# Patient Record
Sex: FEMALE | Race: WHITE | NOT HISPANIC OR LATINO | Employment: OTHER | URBAN - METROPOLITAN AREA
[De-identification: names, ages, dates, MRNs, and addresses within clinical notes are randomized per-mention and may not be internally consistent; named-entity substitution may affect disease eponyms.]

---

## 2022-05-23 ENCOUNTER — APPOINTMENT (OUTPATIENT)
Dept: CT IMAGING | Facility: CLINIC | Age: 72
End: 2022-05-23
Attending: EMERGENCY MEDICINE
Payer: COMMERCIAL

## 2022-05-23 ENCOUNTER — HOSPITAL ENCOUNTER (EMERGENCY)
Facility: CLINIC | Age: 72
Discharge: HOME | End: 2022-05-23
Attending: EMERGENCY MEDICINE | Admitting: EMERGENCY MEDICINE
Payer: COMMERCIAL

## 2022-05-23 ENCOUNTER — APPOINTMENT (OUTPATIENT)
Dept: ULTRASOUND IMAGING | Facility: CLINIC | Age: 72
End: 2022-05-23
Attending: EMERGENCY MEDICINE
Payer: COMMERCIAL

## 2022-05-23 VITALS
HEIGHT: 65 IN | DIASTOLIC BLOOD PRESSURE: 64 MMHG | OXYGEN SATURATION: 89 % | HEART RATE: 81 BPM | TEMPERATURE: 98.2 F | SYSTOLIC BLOOD PRESSURE: 113 MMHG | WEIGHT: 191 LBS | BODY MASS INDEX: 31.82 KG/M2 | RESPIRATION RATE: 24 BRPM

## 2022-05-23 DIAGNOSIS — R91.8 PULMONARY NODULES: ICD-10-CM

## 2022-05-23 DIAGNOSIS — L50.9 HIVES: ICD-10-CM

## 2022-05-23 DIAGNOSIS — R06.02 SOB (SHORTNESS OF BREATH): ICD-10-CM

## 2022-05-23 DIAGNOSIS — M79.89 LEG SWELLING: ICD-10-CM

## 2022-05-23 LAB
ALBUMIN UR-MCNC: 10 MG/DL
ANION GAP SERPL CALCULATED.3IONS-SCNC: 13 MMOL/L (ref 5–18)
APPEARANCE UR: CLEAR
BILIRUB UR QL STRIP: NEGATIVE
BNP SERPL-MCNC: 38 PG/ML (ref 0–123)
BUN SERPL-MCNC: 7 MG/DL (ref 8–28)
CALCIUM SERPL-MCNC: 9.2 MG/DL (ref 8.5–10.5)
CHLORIDE BLD-SCNC: 104 MMOL/L (ref 98–107)
CO2 SERPL-SCNC: 25 MMOL/L (ref 22–31)
COLOR UR AUTO: ABNORMAL
CREAT SERPL-MCNC: 0.74 MG/DL (ref 0.6–1.1)
ERYTHROCYTE [DISTWIDTH] IN BLOOD BY AUTOMATED COUNT: 13.9 % (ref 10–15)
GFR SERPL CREATININE-BSD FRML MDRD: 86 ML/MIN/1.73M2
GLUCOSE BLD-MCNC: 115 MG/DL (ref 70–125)
GLUCOSE UR STRIP-MCNC: NEGATIVE MG/DL
HCT VFR BLD AUTO: 45.7 % (ref 35–47)
HGB BLD-MCNC: 15.2 G/DL (ref 11.7–15.7)
HGB UR QL STRIP: ABNORMAL
HOLD SPECIMEN: NORMAL
HYALINE CASTS: 3 /LPF
KETONES UR STRIP-MCNC: NEGATIVE MG/DL
LEUKOCYTE ESTERASE UR QL STRIP: NEGATIVE
MCH RBC QN AUTO: 32 PG (ref 26.5–33)
MCHC RBC AUTO-ENTMCNC: 33.3 G/DL (ref 31.5–36.5)
MCV RBC AUTO: 96 FL (ref 78–100)
MUCOUS THREADS #/AREA URNS LPF: PRESENT /LPF
NITRATE UR QL: NEGATIVE
PH UR STRIP: 5.5 [PH] (ref 5–7)
PLATELET # BLD AUTO: 202 10E3/UL (ref 150–450)
POTASSIUM BLD-SCNC: 3.3 MMOL/L (ref 3.5–5)
RBC # BLD AUTO: 4.75 10E6/UL (ref 3.8–5.2)
RBC URINE: 9 /HPF
SODIUM SERPL-SCNC: 142 MMOL/L (ref 136–145)
SP GR UR STRIP: 1.04 (ref 1–1.03)
SQUAMOUS EPITHELIAL: 1 /HPF
TRANSITIONAL EPI: <1 /HPF
TROPONIN I SERPL-MCNC: 0.01 NG/ML (ref 0–0.29)
UROBILINOGEN UR STRIP-MCNC: <2 MG/DL
WBC # BLD AUTO: 6.3 10E3/UL (ref 4–11)
WBC URINE: 7 /HPF

## 2022-05-23 PROCEDURE — 99285 EMERGENCY DEPT VISIT HI MDM: CPT | Mod: 25

## 2022-05-23 PROCEDURE — 71275 CT ANGIOGRAPHY CHEST: CPT

## 2022-05-23 PROCEDURE — 84484 ASSAY OF TROPONIN QUANT: CPT | Performed by: EMERGENCY MEDICINE

## 2022-05-23 PROCEDURE — 93970 EXTREMITY STUDY: CPT

## 2022-05-23 PROCEDURE — 250N000011 HC RX IP 250 OP 636: Performed by: EMERGENCY MEDICINE

## 2022-05-23 PROCEDURE — 85027 COMPLETE CBC AUTOMATED: CPT | Performed by: EMERGENCY MEDICINE

## 2022-05-23 PROCEDURE — 250N000009 HC RX 250: Performed by: EMERGENCY MEDICINE

## 2022-05-23 PROCEDURE — 83880 ASSAY OF NATRIURETIC PEPTIDE: CPT | Performed by: EMERGENCY MEDICINE

## 2022-05-23 PROCEDURE — 94640 AIRWAY INHALATION TREATMENT: CPT

## 2022-05-23 PROCEDURE — 81001 URINALYSIS AUTO W/SCOPE: CPT | Performed by: EMERGENCY MEDICINE

## 2022-05-23 PROCEDURE — 250N000013 HC RX MED GY IP 250 OP 250 PS 637: Performed by: EMERGENCY MEDICINE

## 2022-05-23 PROCEDURE — 93005 ELECTROCARDIOGRAM TRACING: CPT | Performed by: EMERGENCY MEDICINE

## 2022-05-23 PROCEDURE — 82310 ASSAY OF CALCIUM: CPT | Performed by: EMERGENCY MEDICINE

## 2022-05-23 PROCEDURE — 96374 THER/PROPH/DIAG INJ IV PUSH: CPT | Mod: 59

## 2022-05-23 PROCEDURE — 36415 COLL VENOUS BLD VENIPUNCTURE: CPT | Performed by: EMERGENCY MEDICINE

## 2022-05-23 PROCEDURE — 96375 TX/PRO/DX INJ NEW DRUG ADDON: CPT | Mod: 59

## 2022-05-23 RX ORDER — METHYLPREDNISOLONE SODIUM SUCCINATE 125 MG/2ML
125 INJECTION, POWDER, LYOPHILIZED, FOR SOLUTION INTRAMUSCULAR; INTRAVENOUS ONCE
Status: COMPLETED | OUTPATIENT
Start: 2022-05-23 | End: 2022-05-23

## 2022-05-23 RX ORDER — POTASSIUM CHLORIDE 1500 MG/1
40 TABLET, EXTENDED RELEASE ORAL ONCE
Status: COMPLETED | OUTPATIENT
Start: 2022-05-23 | End: 2022-05-23

## 2022-05-23 RX ORDER — DIPHENHYDRAMINE HYDROCHLORIDE 50 MG/ML
25 INJECTION INTRAMUSCULAR; INTRAVENOUS ONCE
Status: COMPLETED | OUTPATIENT
Start: 2022-05-23 | End: 2022-05-23

## 2022-05-23 RX ORDER — IPRATROPIUM BROMIDE AND ALBUTEROL SULFATE 2.5; .5 MG/3ML; MG/3ML
3 SOLUTION RESPIRATORY (INHALATION) ONCE
Status: COMPLETED | OUTPATIENT
Start: 2022-05-23 | End: 2022-05-23

## 2022-05-23 RX ORDER — PREDNISONE 20 MG/1
TABLET ORAL
Qty: 10 TABLET | Refills: 0 | Status: SHIPPED | OUTPATIENT
Start: 2022-05-23

## 2022-05-23 RX ORDER — IOPAMIDOL 755 MG/ML
80 INJECTION, SOLUTION INTRAVASCULAR ONCE
Status: COMPLETED | OUTPATIENT
Start: 2022-05-23 | End: 2022-05-23

## 2022-05-23 RX ADMIN — METHYLPREDNISOLONE SODIUM SUCCINATE 125 MG: 125 INJECTION, POWDER, FOR SOLUTION INTRAMUSCULAR; INTRAVENOUS at 13:51

## 2022-05-23 RX ADMIN — IOPAMIDOL 75 ML: 755 INJECTION, SOLUTION INTRAVENOUS at 14:49

## 2022-05-23 RX ADMIN — DIPHENHYDRAMINE HYDROCHLORIDE 25 MG: 50 INJECTION, SOLUTION INTRAMUSCULAR; INTRAVENOUS at 13:51

## 2022-05-23 RX ADMIN — IPRATROPIUM BROMIDE AND ALBUTEROL SULFATE 3 ML: 2.5; .5 SOLUTION RESPIRATORY (INHALATION) at 16:09

## 2022-05-23 RX ADMIN — POTASSIUM CHLORIDE 40 MEQ: 1500 TABLET, EXTENDED RELEASE ORAL at 16:25

## 2022-05-23 RX ADMIN — FAMOTIDINE 20 MG: 10 INJECTION, SOLUTION INTRAVENOUS at 13:53

## 2022-05-23 ASSESSMENT — ENCOUNTER SYMPTOMS
NAUSEA: 0
COUGH: 0
HEADACHES: 0
DIARRHEA: 0
VOMITING: 0
ABDOMINAL PAIN: 0
FEVER: 0

## 2022-05-23 NOTE — ED PROVIDER NOTES
EMERGENCY DEPARTMENT ENCOUNTER      NAME: Goldie Wharton  AGE: 71 year old female  YOB: 1950  MRN: [id number]  EVALUATION DATE & TIME: 2022 12:53 PM    PCP: No primary care provider on file.    ED PROVIDER: Naheed Rodriguez M.D.        Chief Complaint   Patient presents with     Hives         FINAL IMPRESSION:    1. Hives    2. Leg swelling    3. SOB (shortness of breath)            MEDICAL DECISION MAKIN year old female with history of emphysema on home oxygen at night who presents emergency department with shortness of breath now for the past couple of weeks associated with some leg swelling.  She did also develop some hives today after starting Bactrim a few days ago for UTI.  Overall hives were nearly resolved at time I saw her but did treat with some steroids and Benadryl.  She does not appear toxic or septic.  Also trying a DuoNeb.  Imaging of her lungs unremarkable and laboratories look good without signs for CHF, PE, or infectious etiology of her lungs.  Patient signed out at change of shift awaiting results to DuoNeb and possible discharge home with prescription for steroids and antihistamine.        ED COURSE:  1:18 PM  I met with the patient to gather history and perform my exam. ED course and treatment discussed.    Urine Culture  Specimen:  Urine - Urine specimen collection, clean catch (procedure)  Component 3 d ago   Urine Culture  Urogenital Za    Resulting Agency [facility]   Specimen Collected: 22  1:03 PM Last Resulted: 22  3:53 PM   Received From: [facility]  Result Received: 22  2:25 PM     4:30 PM  Patient signed out at change of shift awaiting response to DuoNeb.  She complains of some shortness of breath and leg swelling now for several weeks.  CT negative for PE, no obvious infection, and ultrasound negative for DVT.  BNP normal at 38.  No signs for CHF on imaging.  Ultrasound negative for DVT and therefore less likely to  be PE.  She does have history of emphysema and this may just be more of a COPD exacerbation.  She also reports possible hives from Bactrim.  Urinalysis was reviewed and the culture as summarized above shows more urogenital robyn.  Urinalysis from today is unremarkable.  Therefore at this time unclear that she would really need ongoing antibiotics and may be okay to just discontinue her antibiotics altogether.  Can send her home possibly with prescription for steroids and antihistamine.  This will help both treat possible allergic reaction but may be even a mild COPD exacerbation.  She does not appear toxic or septic.  She does have oxygen at home.  Nothing at this time to suggest COVID.      I do not think that this represents rib fractures, allergic reaction, pneumonia, CHF, myocarditis, pericarditis, endocarditis, ACS, PE, ruptured AAA, pneumothorax, aortic dissection, bowel obstruction, or other such etiologies at this time.      COVID-19 PPE worn during patient evaluation:  Mask: n95 and homemade masks   Eye Protection: goggles   Gown: none   Hair cover: yes  Face shield: none   Patient wearing a mask: yes       CONSULTANTS:  none      MEDICATIONS GIVEN IN THE EMERGENCY:  Medications   diphenhydrAMINE (BENADRYL) injection 25 mg (25 mg Intravenous Given 5/23/22 1351)   methylPREDNISolone sodium succinate (solu-MEDROL) injection 125 mg (125 mg Intravenous Given 5/23/22 1351)   famotidine (PEPCID) injection 20 mg (20 mg Intravenous Given 5/23/22 1353)   iopamidol (ISOVUE-370) solution 80 mL (75 mLs Intravenous Given 5/23/22 1449)   potassium chloride ER (KLOR-CON M) CR tablet 40 mEq (40 mEq Oral Given 5/23/22 1625)   ipratropium - albuterol 0.5 mg/2.5 mg/3 mL (DUONEB) neb solution 3 mL (3 mLs Nebulization Given 5/23/22 1609)           NEW PRESCRIPTIONS STARTED AT TODAY'S ER VISIT     Medication List      There are no discharge medications for this visit.             CONDITION:  stable        DISPOSITION:  pending    "      =================================================================  =================================================================    HPI    Patient information was obtained from: patient    Use of Intrepreter: N/A      Goldie Wharton is a 71 year old female with history of emphysema on oxygen at night who presents to the ER with complaints of hives.    Patient states that she was started on Macrobid last week for UTI through virtual well.  She was then switched to Bactrim and started this on Friday, May 20.  The next day she started noticing itching on her body.  She did not notice actual hives though until today.  She has not taken anything for the hives.    She states in general the past couple of weeks she has noticed some shortness of breath and bilateral lower extremity swelling.  She denies being on any diuretics.  She says she was recently started on beta-blockers.    Otherwise no fevers, cough, chest pain or abdominal pain.  She does state that she has \"noisy breathing\".      REVIEW OF SYSTEMS  Review of Systems   Constitutional: Negative for fever.   Respiratory: Negative for cough.         +noisy breathing   Cardiovascular: Positive for leg swelling. Negative for chest pain.   Gastrointestinal: Negative for abdominal pain, diarrhea, nausea and vomiting.   Skin: Positive for rash.   Allergic/Immunologic: Negative for immunocompromised state.   Neurological: Negative for headaches.   All other systems reviewed and are negative.          PAST MEDICAL HISTORY:  Past Medical History:   Diagnosis Date     Emphysema of lung (H)          PAST SURGICAL HISTORY:  No past surgical history on file.      CURRENT MEDICATIONS:    Prior to Admission medications    Not on File         ALLERGIES:  Allergies   Allergen Reactions     Erythromycin      Abd pain     Penicillins      Unknown reaction as a child         FAMILY HISTORY:  No family history on file.      SOCIAL HISTORY:  Social History " "    Socioeconomic History     Marital status: Single         VITALS:  Patient Vitals for the past 24 hrs:   BP Temp Temp src Pulse Resp SpO2 Height Weight   05/23/22 1615 -- -- -- 73 23 94 % -- --   05/23/22 1605 -- -- -- 74 16 -- -- --   05/23/22 1600 102/55 -- -- 73 19 91 % -- --   05/23/22 1545 123/67 -- -- 82 -- 91 % -- --   05/23/22 1500 124/62 -- -- 77 23 94 % -- --   05/23/22 1430 138/63 -- -- 74 16 92 % -- --   05/23/22 1400 114/60 -- -- 74 18 94 % -- --   05/23/22 1345 -- -- -- 80 23 92 % -- --   05/23/22 1330 102/67 -- -- 77 16 92 % -- --   05/23/22 1315 106/65 -- -- 78 20 97 % -- --   05/23/22 1300 -- -- -- 86 -- 97 % -- --   05/23/22 1250 -- -- -- -- -- 90 % -- --   05/23/22 1240 -- -- -- -- -- (!) 89 % -- --   05/23/22 1239 125/78 98.2  F (36.8  C) Oral 89 22 -- 1.638 m (5' 4.5\") 86.6 kg (191 lb)       Wt Readings from Last 3 Encounters:   05/23/22 86.6 kg (191 lb)         PHYSICAL EXAM    Constitutional:  Well developed, Well nourished, NAD, GCS 15  HENT:  Normocephalic, Atraumatic, Bilateral external ears normal, Nose normal. Neck- Supple, No stridor.  Eyes:  PERRL, EOMI, Conjunctiva normal, No discharge.  Respiratory:  Normal breath sounds, No respiratory distress, No wheezing, Speaks full sentences easily. No cough.   Cardiovascular:  Normal heart rate, Regular rhythm, No rubs, No gallops. Chest wall nontender.   GI:  No excessive obesity.  Bowel sounds normal, Soft, No tenderness, No masses, No flank tenderness. No rebound or guarding.  : deferred  Musculoskeletal: 2+ DP pulses. Trace BLE edema. No cyanosis, No clubbing. Good range of motion in all major joints. No major deformities noted.   Integument:  Warm, Dry, No erythema, No rash.  No petechiae.   Neurologic:  Alert & oriented x 3, No focal deficits noted.   Psychiatric:  Affect normal, Cooperative          LAB:  All pertinent labs reviewed and interpreted.  Recent Results (from the past 24 hour(s))   B-Type Natriuretic Peptide ( [place] " Only)    Collection Time: 05/23/22  1:46 PM   Result Value Ref Range    BNP 38 0 - 123 pg/mL   Extra Blue Top Tube    Collection Time: 05/23/22  1:46 PM   Result Value Ref Range    Hold Specimen JIC    Extra Red Top Tube    Collection Time: 05/23/22  1:46 PM   Result Value Ref Range    Hold Specimen JIC    Extra Green Top (Lithium Heparin) Tube    Collection Time: 05/23/22  1:46 PM   Result Value Ref Range    Hold Specimen JIC    Extra Purple Top Tube    Collection Time: 05/23/22  1:46 PM   Result Value Ref Range    Hold Specimen JIC    CBC (+ platelets, no diff)    Collection Time: 05/23/22  1:47 PM   Result Value Ref Range    WBC Count 6.3 4.0 - 11.0 10e3/uL    RBC Count 4.75 3.80 - 5.20 10e6/uL    Hemoglobin 15.2 11.7 - 15.7 g/dL    Hematocrit 45.7 35.0 - 47.0 %    MCV 96 78 - 100 fL    MCH 32.0 26.5 - 33.0 pg    MCHC 33.3 31.5 - 36.5 g/dL    RDW 13.9 10.0 - 15.0 %    Platelet Count 202 150 - 450 10e3/uL   Basic metabolic panel    Collection Time: 05/23/22  1:47 PM   Result Value Ref Range    Sodium 142 136 - 145 mmol/L    Potassium 3.3 (L) 3.5 - 5.0 mmol/L    Chloride 104 98 - 107 mmol/L    Carbon Dioxide (CO2) 25 22 - 31 mmol/L    Anion Gap 13 5 - 18 mmol/L    Urea Nitrogen 7 (L) 8 - 28 mg/dL    Creatinine 0.74 0.60 - 1.10 mg/dL    Calcium 9.2 8.5 - 10.5 mg/dL    Glucose 115 70 - 125 mg/dL    GFR Estimate 86 >60 mL/min/1.73m2   Troponin I (now)    Collection Time: 05/23/22  1:47 PM   Result Value Ref Range    Troponin I 0.01 0.00 - 0.29 ng/mL   UA with Microscopic reflex to Culture    Collection Time: 05/23/22  3:38 PM    Specimen: Urine, Clean Catch   Result Value Ref Range    Color Urine Light Yellow Colorless, Straw, Light Yellow, Yellow    Appearance Urine Clear Clear    Glucose Urine Negative Negative mg/dL    Bilirubin Urine Negative Negative    Ketones Urine Negative Negative mg/dL    Specific Gravity Urine 1.038 (H) 1.001 - 1.030    Blood Urine 0.5 mg/dL (A) Negative    pH Urine 5.5 5.0 - 7.0    Protein  Albumin Urine 10  (A) Negative mg/dL    Urobilinogen Urine <2.0 <2.0 mg/dL    Nitrite Urine Negative Negative    Leukocyte Esterase Urine Negative Negative    Mucus Urine Present (A) None Seen /LPF    RBC Urine 9 (H) <=2 /HPF    WBC Urine 7 (H) <=5 /HPF    Squamous Epithelials Urine 1 <=1 /HPF    Transitional Epithelials Urine <1 <=1 /HPF    Hyaline Casts Urine 3 (H) <=2 /LPF       No results found for: [facility]        RADIOLOGY:  Reviewed all pertinent imaging. Please see official radiology report.    CT Chest Pulmonary Embolism w Contrast   Final Result   IMPRESSION:   1.  No pulmonary artery embolism or thoracic aortic dissection.   2.  Moderate to severe emphysema and mild nonspecific bronchiolitis.   3.  Bilateral pulmonary nodules including clustered right upper lobe 2 to 5 mm nodules, likely postinfectious or postinflammatory. Recommend one-year CT follow-up per guidelines below.   4.  Mild mediastinal and hilar adenopathy which may be reactive. Recommend 3 month CT follow-up.   5.  Indeterminant 1.9 cm left adrenal gland nodule, likely an adenoma. Recommend attention on follow-up imaging.         REFERENCE:   Guidelines for Management of Incidental Pulmonary Nodules Detected on CT Images: From the Fleischner Society 2017.    Guidelines apply to incidental nodules in patients who are 35 years or older.   Guidelines do not apply to lung cancer screening, patients with immunosuppression, or patients with known primary cancer.      MULTIPLE NODULES   Nodule size less than or equal to 6 mm   Low-risk patients: No follow-up needed.   High-risk patients: Optional follow-up at 12 months.               US Lower Extremity Venous Duplex Bilateral   Final Result   IMPRESSION:   1.  No deep venous thrombosis in the bilateral lower extremities.            EKG:    Indication: EKG ordered by RN    Performed at: 13:05p  Impression: Sinus rhythm at 83 bpm.  Flipped T waves noted in lead aVR and V1.  Slow R wave progression.  MN  interval 186 ms, QRS 90 ms,  ms.  No prior EKGs to compare to.      I have independently reviewed and interpreted the EKG(s) documented above.        PROCEDURES:  none    Naheed Rodriguez M.D. [facility]  Emergency Medicine and Medical Toxicology  [facility] EMERGENCY ROOM  [address] [id number] [phone]  Dept: [phone]           Naheed Rodriguez MD  05/23/22 2985

## 2022-05-23 NOTE — ED TRIAGE NOTES
Patient reports 3 week history of ankle and feet swelling. Recent UTI diagnosis and started on Bactrim on Friday, has hives on body since this morning when she woke up.      Triage Assessment     Row Name 05/23/22 1244       Triage Assessment (Adult)    Airway WDL WDL       Respiratory WDL    Respiratory WDL X  sob, wheezing, sats varying 89-94%, cough       Skin Circulation/Temperature WDL    Skin Circulation/Temperature WDL X  cool extremities bilateral        Peripheral/Neurovascular WDL    Peripheral Neurovascular WDL X  right hand numbness x1 minute prior to arrival. Patient reports swelilng to wrists, feet, legs.     Capillary Refill, General less than/equal to 3 secs       [place] Coma Scale    Best Eye Response 4-->(E4) spontaneous    Best Motor Response 6-->(M6) obeys commands    Best Verbal Response 5-->(V5) oriented    [place] Coma Scale Score 15

## 2022-05-23 NOTE — DISCHARGE INSTRUCTIONS
As we discussed its okay to use Benadryl/diphenhydramine for itching or your welts.  Recommend prednisone daily for 5 days.  Urine culture is pending.  You have some pulmonary nodules that your doctor and pulmonologist need to further monitor with a repeat CT scan of chest in 3-months. Recommend a recheck with your doctor.  Return to the ER for worsening symptoms

## 2022-05-23 NOTE — ED NOTES
"EMERGENCY DEPARTMENT SIGN OUT NOTE        ED COURSE AND MEDICAL DECISION MAKING  Patient was signed out to me by Dr Naheed Rodriguez at 4:21 PM  5:07 PM I rechecked and updated the patient. Patient states that she is not short of breath. No increased work of breathing from usual. She says her oxygen levels are the same as they are at home and she does have oxygen at home that she typically uses at night. She denies chest pain.     In brief, Goldie Wharton is a 71 year old female who initially presented with shortness of breath for the past couple of weeks with associated leg swelling. She also developed some hives today after starting Bactrim a few days ago for UTI.     At time of sign out, disposition was pending response to DuoNeb    Extensive discussion regarding results and potential management.     Patient reports she does not feel short of breath at all and denies any change in her chronic cough.  She states her breathing is at baseline.  She does have oxygen at home.  She does have some lower oxygen readings when she goes in the clinic from time to time.  Her CTA PE was negative pulmonary emboli or bacterial pneumonia.  It does show severe emphysema and bronchitis as well as some enlarged lymph nodes on nodules which I discussed with patient.  She has chronic hypoxemia.    Discussed she needs a repeat CT in 3 months.  We discussed her urinalysis from last visit [facility] 3 days ago was contaminated therefore difficult to interpret if she truly had an infection or not.  Discussed empiric antibiotics even though she has no urinary symptoms currently versus waiting for urine culture from today and a better sample.    Patient wants to wait for urine culture.    Discussed admission for observation which patient is declining for oxygen less than 90%.  Patient states she does not have any changes in her chronic respiratory symptoms.     Patient had \"welts\" which have improved.  " Anaphylaxis/angioedema/dress syndrome unlike.  Given Gerry syndrome unlikely.  Likely drug eruption.    With welts and possible drug reaction and recommend Benadryl as well as prednisone also likely help with any COPD exacerbation.  White blood cell count normal.     Leg swelling has been noted since she saw cardiology recently and leg ultrasound is negative for DVT.  BNP normal.  CHF less likely    Possible reaction to amlodipine which was recently stopped versus lymphedema.                FINAL IMPRESSION    1. Hives    2. Leg swelling    3. SOB (shortness of breath)    4. Pulmonary nodules        ED MEDS  Medications   diphenhydrAMINE (BENADRYL) injection 25 mg (25 mg Intravenous Given 5/23/22 1351)   methylPREDNISolone sodium succinate (solu-MEDROL) injection 125 mg (125 mg Intravenous Given 5/23/22 1351)   famotidine (PEPCID) injection 20 mg (20 mg Intravenous Given 5/23/22 1353)   iopamidol (ISOVUE-370) solution 80 mL (75 mLs Intravenous Given 5/23/22 1449)   potassium chloride ER (KLOR-CON M) CR tablet 40 mEq (40 mEq Oral Given 5/23/22 1625)   ipratropium - albuterol 0.5 mg/2.5 mg/3 mL (DUONEB) neb solution 3 mL (3 mLs Nebulization Given 5/23/22 1609)       LAB  Labs Ordered and Resulted from Time of ED Arrival to Time of ED Departure   ROUTINE UA WITH MICROSCOPIC REFLEX TO CULTURE - Abnormal       Result Value    Color Urine Light Yellow      Appearance Urine Clear      Glucose Urine Negative      Bilirubin Urine Negative      Ketones Urine Negative      Specific Gravity Urine 1.038 (*)     Blood Urine 0.5 mg/dL (*)     pH Urine 5.5      Protein Albumin Urine 10  (*)     Urobilinogen Urine <2.0      Nitrite Urine Negative      Leukocyte Esterase Urine Negative      Mucus Urine Present (*)     RBC Urine 9 (*)     WBC Urine 7 (*)     Squamous Epithelials Urine 1      Transitional Epithelials Urine <1      Hyaline Casts Urine 3 (*)    BASIC METABOLIC PANEL - Abnormal    Sodium 142      Potassium 3.3 (*)      Chloride 104      Carbon Dioxide (CO2) 25      Anion Gap 13      Urea Nitrogen 7 (*)     Creatinine 0.74      Calcium 9.2      Glucose 115      GFR Estimate 86     CBC WITH PLATELETS - Normal    WBC Count 6.3      RBC Count 4.75      Hemoglobin 15.2      Hematocrit 45.7      MCV 96      MCH 32.0      MCHC 33.3      RDW 13.9      Platelet Count 202     TROPONIN I - Normal    Troponin I 0.01     B-TYPE NATRIURETIC PEPTIDE ([facility] ONLY) - Normal    BNP 38         RADIOLOGY    CT Chest Pulmonary Embolism w Contrast   Final Result   IMPRESSION:   1.  No pulmonary artery embolism or thoracic aortic dissection.   2.  Moderate to severe emphysema and mild nonspecific bronchiolitis.   3.  Bilateral pulmonary nodules including clustered right upper lobe 2 to 5 mm nodules, likely postinfectious or postinflammatory. Recommend one-year CT follow-up per guidelines below.   4.  Mild mediastinal and hilar adenopathy which may be reactive. Recommend 3 month CT follow-up.   5.  Indeterminant 1.9 cm left adrenal gland nodule, likely an adenoma. Recommend attention on follow-up imaging.         REFERENCE:   Guidelines for Management of Incidental Pulmonary Nodules Detected on CT Images: From the Fleischner Society 2017.    Guidelines apply to incidental nodules in patients who are 35 years or older.   Guidelines do not apply to lung cancer screening, patients with immunosuppression, or patients with known primary cancer.      MULTIPLE NODULES   Nodule size less than or equal to 6 mm   Low-risk patients: No follow-up needed.   High-risk patients: Optional follow-up at 12 months.               US Lower Extremity Venous Duplex Bilateral   Final Result   IMPRESSION:   1.  No deep venous thrombosis in the bilateral lower extremities.          DISCHARGE MEDS  New Prescriptions    PREDNISONE (DELTASONE) 20 MG TABLET    Take two tablets (= 40mg) each day for 5 (five) days     Jaime BARBOZA, am serving as a scribe to document services  personally performed by Dr. Suresh, based on my observations and the provider's statements to me.  I, Tomas Suresh MD, attest that Jaime Stephen is acting in a scribe capacity, has observed my performance of the services and has documented them in accordance with my direction.        Tomas Suresh MD  Emergency Medicine  [facility] EMERGENCY ROOM  [address] [id number]  [phone]     Tomas Suresh MD  05/23/22 8209